# Patient Record
Sex: FEMALE | Race: WHITE | NOT HISPANIC OR LATINO | URBAN - METROPOLITAN AREA
[De-identification: names, ages, dates, MRNs, and addresses within clinical notes are randomized per-mention and may not be internally consistent; named-entity substitution may affect disease eponyms.]

---

## 2018-03-05 ENCOUNTER — INPATIENT (INPATIENT)
Facility: HOSPITAL | Age: 68
LOS: 3 days | Discharge: HOME | End: 2018-03-09
Attending: INTERNAL MEDICINE

## 2018-03-05 VITALS
TEMPERATURE: 99 F | WEIGHT: 104.94 LBS | SYSTOLIC BLOOD PRESSURE: 152 MMHG | DIASTOLIC BLOOD PRESSURE: 89 MMHG | HEART RATE: 111 BPM | RESPIRATION RATE: 16 BRPM | HEIGHT: 62 IN

## 2018-03-05 DIAGNOSIS — F41.1 GENERALIZED ANXIETY DISORDER: ICD-10-CM

## 2018-03-05 DIAGNOSIS — F33.2 MAJOR DEPRESSIVE DISORDER, RECURRENT SEVERE WITHOUT PSYCHOTIC FEATURES: ICD-10-CM

## 2018-03-05 DIAGNOSIS — F10.20 ALCOHOL DEPENDENCE, UNCOMPLICATED: ICD-10-CM

## 2018-03-05 DIAGNOSIS — F17.200 NICOTINE DEPENDENCE, UNSPECIFIED, UNCOMPLICATED: ICD-10-CM

## 2018-03-05 LAB
ALBUMIN SERPL ELPH-MCNC: 4.1 G/DL — SIGNIFICANT CHANGE UP (ref 3–5.5)
ALP SERPL-CCNC: 76 U/L — SIGNIFICANT CHANGE UP (ref 30–115)
ALT FLD-CCNC: 163 U/L — HIGH (ref 0–41)
AMMONIA BLD-MCNC: 59 MMOL/L — CRITICAL HIGH (ref 11–35)
AMYLASE P1 CFR SERPL: 178 U/L — HIGH (ref 25–115)
ANION GAP SERPL CALC-SCNC: 12 MMOL/L — SIGNIFICANT CHANGE UP (ref 7–14)
APPEARANCE UR: CLEAR — SIGNIFICANT CHANGE UP
APTT BLD: 31.6 SEC — SIGNIFICANT CHANGE UP (ref 27–39.2)
AST SERPL-CCNC: <5 U/L — SIGNIFICANT CHANGE UP (ref 0–41)
BACTERIA # UR AUTO: (no result)
BASOPHILS # BLD AUTO: 0.06 K/UL — SIGNIFICANT CHANGE UP (ref 0–0.2)
BASOPHILS NFR BLD AUTO: 1 % — SIGNIFICANT CHANGE UP (ref 0–1)
BILIRUB SERPL-MCNC: 1.3 MG/DL — HIGH (ref 0.2–1.2)
BILIRUB UR-MCNC: NEGATIVE — SIGNIFICANT CHANGE UP
BUN SERPL-MCNC: 8 MG/DL — LOW (ref 10–20)
CALCIUM SERPL-MCNC: 8.6 MG/DL — SIGNIFICANT CHANGE UP (ref 8.5–10.1)
CHLORIDE SERPL-SCNC: 96 MMOL/L — LOW (ref 98–110)
CHOLEST SERPL-MCNC: 216 MG/DL — HIGH (ref 100–200)
CO2 SERPL-SCNC: 26 MMOL/L — SIGNIFICANT CHANGE UP (ref 17–32)
COLOR SPEC: YELLOW — SIGNIFICANT CHANGE UP
CREAT SERPL-MCNC: 0.6 MG/DL — LOW (ref 0.7–1.5)
DIFF PNL FLD: (no result)
EOSINOPHIL # BLD AUTO: 0.04 K/UL — SIGNIFICANT CHANGE UP (ref 0–0.7)
EOSINOPHIL NFR BLD AUTO: 0.7 % — SIGNIFICANT CHANGE UP (ref 0–8)
EPI CELLS # UR: (no result) /HPF
ETHANOL SERPL-MCNC: <5 MG/DL — SIGNIFICANT CHANGE UP
GGT SERPL-CCNC: 581 U/L — HIGH (ref 1–40)
GLUCOSE SERPL-MCNC: 130 MG/DL — HIGH (ref 70–110)
GLUCOSE UR QL: NEGATIVE MG/DL — SIGNIFICANT CHANGE UP
HCG UR QL: NEGATIVE — SIGNIFICANT CHANGE UP
HCT VFR BLD CALC: 40 % — SIGNIFICANT CHANGE UP (ref 37–47)
HDLC SERPL-MCNC: 124 MG/DL — HIGH (ref 40–60)
HGB BLD-MCNC: 13.7 G/DL — SIGNIFICANT CHANGE UP (ref 12–16)
IMM GRANULOCYTES NFR BLD AUTO: 0.5 % — HIGH (ref 0.1–0.3)
INR BLD: 0.99 RATIO — SIGNIFICANT CHANGE UP (ref 0.65–1.3)
KETONES UR-MCNC: NEGATIVE — SIGNIFICANT CHANGE UP
LEUKOCYTE ESTERASE UR-ACNC: (no result)
LIPID PNL WITH DIRECT LDL SERPL: 66 MG/DL — SIGNIFICANT CHANGE UP (ref 50–100)
LYMPHOCYTES # BLD AUTO: 1.56 K/UL — SIGNIFICANT CHANGE UP (ref 1.2–3.4)
LYMPHOCYTES # BLD AUTO: 27.1 % — SIGNIFICANT CHANGE UP (ref 20.5–51.1)
MAGNESIUM SERPL-MCNC: 1.2 MG/DL — LOW (ref 1.8–2.4)
MCHC RBC-ENTMCNC: 32.9 PG — HIGH (ref 27–31)
MCHC RBC-ENTMCNC: 34.3 G/DL — SIGNIFICANT CHANGE UP (ref 32–37)
MCV RBC AUTO: 96.2 FL — SIGNIFICANT CHANGE UP (ref 81–99)
MONOCYTES # BLD AUTO: 0.45 K/UL — SIGNIFICANT CHANGE UP (ref 0.1–0.6)
MONOCYTES NFR BLD AUTO: 7.8 % — SIGNIFICANT CHANGE UP (ref 1.7–9.3)
NEUTROPHILS # BLD AUTO: 3.61 K/UL — SIGNIFICANT CHANGE UP (ref 1.4–6.5)
NEUTROPHILS NFR BLD AUTO: 62.9 % — SIGNIFICANT CHANGE UP (ref 42.2–75.2)
NITRITE UR-MCNC: NEGATIVE — SIGNIFICANT CHANGE UP
NRBC # BLD: 0 /100 WBCS — SIGNIFICANT CHANGE UP (ref 0–0)
PH UR: 7 — SIGNIFICANT CHANGE UP (ref 5–8)
PLATELET # BLD AUTO: 154 K/UL — SIGNIFICANT CHANGE UP (ref 130–400)
POTASSIUM SERPL-MCNC: 3.7 MMOL/L — SIGNIFICANT CHANGE UP (ref 3.5–5)
POTASSIUM SERPL-SCNC: 3.7 MMOL/L — SIGNIFICANT CHANGE UP (ref 3.5–5)
PROT SERPL-MCNC: 8 G/DL — SIGNIFICANT CHANGE UP (ref 6–8)
PROT UR-MCNC: 30 MG/DL
PROTHROM AB SERPL-ACNC: 10.7 SEC — SIGNIFICANT CHANGE UP (ref 9.95–12.87)
RBC # BLD: 4.16 M/UL — LOW (ref 4.2–5.4)
RBC # FLD: 12.4 % — SIGNIFICANT CHANGE UP (ref 11.5–14.5)
RBC CASTS # UR COMP ASSIST: SIGNIFICANT CHANGE UP /HPF
SODIUM SERPL-SCNC: 134 MMOL/L — LOW (ref 135–146)
SP GR SPEC: 1.02 — SIGNIFICANT CHANGE UP (ref 1.01–1.03)
TOTAL CHOLESTEROL/HDL RATIO MEASUREMENT: 1.7 RATIO — LOW (ref 4–5.5)
TRIGL SERPL-MCNC: 67 MG/DL — SIGNIFICANT CHANGE UP (ref 40–150)
UROBILINOGEN FLD QL: 2 MG/DL (ref 0.2–0.2)
WBC # BLD: 5.75 K/UL — SIGNIFICANT CHANGE UP (ref 4.8–10.8)
WBC # FLD AUTO: 5.75 K/UL — SIGNIFICANT CHANGE UP (ref 4.8–10.8)
WBC UR QL: SIGNIFICANT CHANGE UP /HPF

## 2018-03-05 RX ORDER — ACETAMINOPHEN 500 MG
650 TABLET ORAL EVERY 6 HOURS
Qty: 0 | Refills: 0 | Status: DISCONTINUED | OUTPATIENT
Start: 2018-03-05 | End: 2018-03-09

## 2018-03-05 RX ORDER — HYDROXYZINE HCL 10 MG
100 TABLET ORAL AT BEDTIME
Qty: 0 | Refills: 0 | Status: DISCONTINUED | OUTPATIENT
Start: 2018-03-05 | End: 2018-03-09

## 2018-03-05 RX ORDER — THIAMINE MONONITRATE (VIT B1) 100 MG
100 TABLET ORAL DAILY
Qty: 0 | Refills: 0 | Status: DISCONTINUED | OUTPATIENT
Start: 2018-03-05 | End: 2018-03-09

## 2018-03-05 RX ORDER — HYDROXYZINE HCL 10 MG
10 TABLET ORAL AT BEDTIME
Qty: 0 | Refills: 0 | Status: DISCONTINUED | OUTPATIENT
Start: 2018-03-05 | End: 2018-03-05

## 2018-03-05 RX ORDER — FOLIC ACID 0.8 MG
1 TABLET ORAL DAILY
Qty: 0 | Refills: 0 | Status: DISCONTINUED | OUTPATIENT
Start: 2018-03-05 | End: 2018-03-09

## 2018-03-05 RX ORDER — HYDROXYZINE HCL 10 MG
50 TABLET ORAL EVERY 6 HOURS
Qty: 0 | Refills: 0 | Status: DISCONTINUED | OUTPATIENT
Start: 2018-03-05 | End: 2018-03-09

## 2018-03-05 RX ORDER — IBUPROFEN 200 MG
400 TABLET ORAL EVERY 6 HOURS
Qty: 0 | Refills: 0 | Status: DISCONTINUED | OUTPATIENT
Start: 2018-03-05 | End: 2018-03-09

## 2018-03-05 RX ADMIN — Medication 1 TABLET(S): at 17:18

## 2018-03-05 RX ADMIN — Medication 2 MILLIGRAM(S): at 11:20

## 2018-03-05 RX ADMIN — Medication 25 MILLIGRAM(S): at 17:17

## 2018-03-05 RX ADMIN — Medication 1 MILLIGRAM(S): at 17:16

## 2018-03-05 RX ADMIN — Medication 25 MILLIGRAM(S): at 21:14

## 2018-03-05 RX ADMIN — Medication 25 MILLIGRAM(S): at 13:56

## 2018-03-05 RX ADMIN — Medication 100 MILLIGRAM(S): at 21:15

## 2018-03-05 RX ADMIN — Medication 100 MILLIGRAM(S): at 17:16

## 2018-03-06 DIAGNOSIS — F10.24 ALCOHOL DEPENDENCE WITH ALCOHOL-INDUCED MOOD DISORDER: ICD-10-CM

## 2018-03-06 LAB
AMPHET UR-MCNC: NEGATIVE — SIGNIFICANT CHANGE UP
BARBITURATES UR SCN-MCNC: NEGATIVE — SIGNIFICANT CHANGE UP
BENZODIAZ UR-MCNC: NEGATIVE — SIGNIFICANT CHANGE UP
COCAINE METAB.OTHER UR-MCNC: NEGATIVE — SIGNIFICANT CHANGE UP
DRUG SCREEN 1, URINE RESULT: SIGNIFICANT CHANGE UP
ESTIMATED AVERAGE GLUCOSE: 100 MG/DL — SIGNIFICANT CHANGE UP (ref 68–114)
HBA1C BLD-MCNC: 5.1 % — SIGNIFICANT CHANGE UP (ref 4–5.6)
HBV CORE AB SER-ACNC: REACTIVE
HBV SURFACE AB SER-ACNC: SIGNIFICANT CHANGE UP
HBV SURFACE AG SER-ACNC: SIGNIFICANT CHANGE UP
HCV AB S/CO SERPL IA: 13.25 S/CO — SIGNIFICANT CHANGE UP
HCV AB SERPL-IMP: REACTIVE
HIV 1+2 AB+HIV1 P24 AG SERPL QL IA: SIGNIFICANT CHANGE UP
METHADONE UR-MCNC: NEGATIVE — SIGNIFICANT CHANGE UP
OPIATES UR-MCNC: NEGATIVE — SIGNIFICANT CHANGE UP
PCP UR-MCNC: NEGATIVE — SIGNIFICANT CHANGE UP
PROPOXYPHENE QUALITATIVE URINE RESULT: NEGATIVE — SIGNIFICANT CHANGE UP
T PALLIDUM AB TITR SER: NEGATIVE — SIGNIFICANT CHANGE UP
THC UR QL: POSITIVE

## 2018-03-06 RX ORDER — SERTRALINE 25 MG/1
50 TABLET, FILM COATED ORAL DAILY
Qty: 0 | Refills: 0 | Status: DISCONTINUED | OUTPATIENT
Start: 2018-03-06 | End: 2018-03-08

## 2018-03-06 RX ORDER — MAGNESIUM OXIDE 400 MG ORAL TABLET 241.3 MG
400 TABLET ORAL
Qty: 0 | Refills: 0 | Status: DISCONTINUED | OUTPATIENT
Start: 2018-03-06 | End: 2018-03-09

## 2018-03-06 RX ADMIN — Medication 25 MILLIGRAM(S): at 05:51

## 2018-03-06 RX ADMIN — Medication 20 MILLIGRAM(S): at 17:41

## 2018-03-06 RX ADMIN — SERTRALINE 50 MILLIGRAM(S): 25 TABLET, FILM COATED ORAL at 19:10

## 2018-03-06 RX ADMIN — Medication 1 MILLIGRAM(S): at 08:43

## 2018-03-06 RX ADMIN — Medication 100 MILLIGRAM(S): at 08:43

## 2018-03-06 RX ADMIN — Medication 25 MILLIGRAM(S): at 09:05

## 2018-03-06 RX ADMIN — MAGNESIUM OXIDE 400 MG ORAL TABLET 400 MILLIGRAM(S): 241.3 TABLET ORAL at 08:43

## 2018-03-06 RX ADMIN — Medication 1 TABLET(S): at 08:43

## 2018-03-06 RX ADMIN — MAGNESIUM OXIDE 400 MG ORAL TABLET 400 MILLIGRAM(S): 241.3 TABLET ORAL at 19:10

## 2018-03-06 RX ADMIN — Medication 20 MILLIGRAM(S): at 21:10

## 2018-03-06 RX ADMIN — Medication 50 MILLIGRAM(S): at 23:53

## 2018-03-06 RX ADMIN — Medication 100 MILLIGRAM(S): at 21:11

## 2018-03-06 RX ADMIN — Medication 20 MILLIGRAM(S): at 13:08

## 2018-03-06 NOTE — BEHAVIORAL HEALTH ASSESSMENT NOTE - HPI (INCLUDE ILLNESS QUALITY, SEVERITY, DURATION, TIMING, CONTEXT, MODIFYING FACTORS, ASSOCIATED SIGNS AND SYMPTOMS)
69 yo MWF w ho etoh use disorder, pt also admits to intermittent abuse of methadone illicitly purchased and intermittent mj. Pt recalls father was etoh dep and pt began "sipping his drinks" when she was 6. Pt reports doing stimulants, heroin, etoh during teen years and cocaine (crack cocaine). 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 Pt reports she never sought treatment until after alf when drinking escalated. Pt retired 4 years ago and since then has been drinking daily. This is pt's 4th detox. Pt has been drinking 1 liter of vodka daily prior to coming in. Pt reports being motivated to come into detox due to medical complications of drinking. "It is going to kill me". Longest stretch of abstinence was 6 mos 1 yr ago with heavy involvement in AA. Pt was on Zoloft briefly during abstinence.   Pt had first contact with psychiatrist at age "18s" for depression. 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 Pt denies past IPP, denies past suicide attempts, denies current SI. Most recently pt feels "exhausted, confused (cognitive dulling), can't walk properly, shakey". Pt sleeps poorly in general but did well last evening with meds prescribed. Pt denies passive death wish, pt feels motivated and feels she could accomplish goals with "a little help and will power".  is supportive of recovery and is "disappointed in my drinking".

## 2018-03-06 NOTE — BEHAVIORAL HEALTH ASSESSMENT NOTE - SUMMARY
69 yo MWF with history of mdd with a trial of zoloft which she is not sure was beneficial. Pt did not have any adverse effects. Pt w etoh use disorder since teens w medical complications of recent sz, and reports ongoing diarrhea when drinking so uses Immodium. Pt denies SI. Is willing to start SSRI.

## 2018-03-07 LAB
HCV RNA FLD QL NAA+PROBE: SIGNIFICANT CHANGE UP
HCV RNA SPEC QL PROBE+SIG AMP: DETECTED
M TB TUBERC IFN-G BLD QL: 0.02 IU/ML — SIGNIFICANT CHANGE UP
M TB TUBERC IFN-G BLD QL: 0.06 IU/ML — SIGNIFICANT CHANGE UP
M TB TUBERC IFN-G BLD QL: NEGATIVE — SIGNIFICANT CHANGE UP
MITOGEN IGNF BCKGRD COR BLD-ACNC: 1.55 IU/ML — SIGNIFICANT CHANGE UP

## 2018-03-07 RX ADMIN — Medication 15 MILLIGRAM(S): at 13:39

## 2018-03-07 RX ADMIN — Medication 1 TABLET(S): at 09:47

## 2018-03-07 RX ADMIN — SERTRALINE 50 MILLIGRAM(S): 25 TABLET, FILM COATED ORAL at 09:48

## 2018-03-07 RX ADMIN — MAGNESIUM OXIDE 400 MG ORAL TABLET 400 MILLIGRAM(S): 241.3 TABLET ORAL at 09:46

## 2018-03-07 RX ADMIN — Medication 100 MILLIGRAM(S): at 21:29

## 2018-03-07 RX ADMIN — Medication 25 MILLIGRAM(S): at 00:55

## 2018-03-07 RX ADMIN — Medication 20 MILLIGRAM(S): at 04:34

## 2018-03-07 RX ADMIN — Medication 20 MILLIGRAM(S): at 09:47

## 2018-03-07 RX ADMIN — MAGNESIUM OXIDE 400 MG ORAL TABLET 400 MILLIGRAM(S): 241.3 TABLET ORAL at 13:38

## 2018-03-07 RX ADMIN — Medication 1 MILLIGRAM(S): at 09:47

## 2018-03-07 RX ADMIN — MAGNESIUM OXIDE 400 MG ORAL TABLET 400 MILLIGRAM(S): 241.3 TABLET ORAL at 21:29

## 2018-03-07 RX ADMIN — Medication 15 MILLIGRAM(S): at 21:28

## 2018-03-07 RX ADMIN — Medication 100 MILLIGRAM(S): at 09:47

## 2018-03-07 RX ADMIN — Medication 15 MILLIGRAM(S): at 17:40

## 2018-03-08 LAB — VIT B12 SERPL-MCNC: 1623 PG/ML — HIGH (ref 232–1245)

## 2018-03-08 RX ORDER — SERTRALINE 25 MG/1
75 TABLET, FILM COATED ORAL DAILY
Qty: 0 | Refills: 0 | Status: DISCONTINUED | OUTPATIENT
Start: 2018-03-08 | End: 2018-03-09

## 2018-03-08 RX ADMIN — Medication 15 MILLIGRAM(S): at 05:55

## 2018-03-08 RX ADMIN — Medication 100 MILLIGRAM(S): at 21:20

## 2018-03-08 RX ADMIN — MAGNESIUM OXIDE 400 MG ORAL TABLET 400 MILLIGRAM(S): 241.3 TABLET ORAL at 09:54

## 2018-03-08 RX ADMIN — Medication 1 MILLIGRAM(S): at 09:54

## 2018-03-08 RX ADMIN — Medication 100 MILLIGRAM(S): at 09:54

## 2018-03-08 RX ADMIN — Medication 1 TABLET(S): at 09:54

## 2018-03-08 RX ADMIN — SERTRALINE 75 MILLIGRAM(S): 25 TABLET, FILM COATED ORAL at 09:54

## 2018-03-08 RX ADMIN — Medication 100 MILLIGRAM(S): at 03:02

## 2018-03-08 RX ADMIN — MAGNESIUM OXIDE 400 MG ORAL TABLET 400 MILLIGRAM(S): 241.3 TABLET ORAL at 12:51

## 2018-03-08 NOTE — CHART NOTE - NSCHARTNOTEFT_GEN_A_CORE
Allergies:  No Known Allergies      Diet: Regular    Activity: as tolerated    Follow up with    1. PMD in 2 weeks    2. Psych in 2 weeks    3. GI evaluation    Follow up for abnormal labs/tests    1. bright blood per rectum    Extra Instructions:      Flu Vaccine given  Yes_____         No______      Diagnosis:  Chemical Dependency   Maintain sobriety  refrain from all use      Patient Signature___________________________________________  Date_________________      Nurse Signature_____________________________________________Date_________________

## 2018-03-09 VITALS — SYSTOLIC BLOOD PRESSURE: 100 MMHG | DIASTOLIC BLOOD PRESSURE: 60 MMHG | TEMPERATURE: 96 F | HEART RATE: 80 BPM

## 2018-03-09 RX ORDER — SERTRALINE 25 MG/1
3 TABLET, FILM COATED ORAL
Qty: 90 | Refills: 0 | OUTPATIENT
Start: 2018-03-09

## 2018-03-09 RX ADMIN — Medication 100 MILLIGRAM(S): at 08:49

## 2018-03-09 RX ADMIN — MAGNESIUM OXIDE 400 MG ORAL TABLET 400 MILLIGRAM(S): 241.3 TABLET ORAL at 11:56

## 2018-03-09 RX ADMIN — Medication 1 MILLIGRAM(S): at 08:48

## 2018-03-09 RX ADMIN — Medication 10 MILLIGRAM(S): at 01:02

## 2018-03-09 RX ADMIN — Medication 1 TABLET(S): at 08:48

## 2018-03-09 RX ADMIN — Medication 50 MILLIGRAM(S): at 00:58

## 2018-03-09 RX ADMIN — Medication 10 MILLIGRAM(S): at 09:05

## 2018-03-09 RX ADMIN — MAGNESIUM OXIDE 400 MG ORAL TABLET 400 MILLIGRAM(S): 241.3 TABLET ORAL at 07:59

## 2018-03-09 RX ADMIN — SERTRALINE 75 MILLIGRAM(S): 25 TABLET, FILM COATED ORAL at 08:49

## 2018-03-09 RX ADMIN — Medication 10 MILLIGRAM(S): at 05:52

## 2018-03-13 DIAGNOSIS — F41.9 ANXIETY DISORDER, UNSPECIFIED: ICD-10-CM

## 2018-03-13 DIAGNOSIS — B19.20 UNSPECIFIED VIRAL HEPATITIS C WITHOUT HEPATIC COMA: ICD-10-CM
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
## 2018-03-13 DIAGNOSIS — M19.90 UNSPECIFIED OSTEOARTHRITIS, UNSPECIFIED SITE: ICD-10-CM

## 2018-03-13 DIAGNOSIS — F32.9 MAJOR DEPRESSIVE DISORDER, SINGLE EPISODE, UNSPECIFIED: ICD-10-CM

## 2018-03-13 DIAGNOSIS — F17.210 NICOTINE DEPENDENCE, CIGARETTES, UNCOMPLICATED: ICD-10-CM

## 2018-03-13 DIAGNOSIS — F11.20 OPIOID DEPENDENCE, UNCOMPLICATED: ICD-10-CM

## 2018-03-13 DIAGNOSIS — F10.20 ALCOHOL DEPENDENCE, UNCOMPLICATED: ICD-10-CM
